# Patient Record
Sex: MALE | Race: WHITE | Employment: FULL TIME | URBAN - METROPOLITAN AREA
[De-identification: names, ages, dates, MRNs, and addresses within clinical notes are randomized per-mention and may not be internally consistent; named-entity substitution may affect disease eponyms.]

---

## 2021-04-21 PROBLEM — M54.2 NECK PAIN: Status: ACTIVE | Noted: 2021-04-21

## 2021-04-21 PROBLEM — R20.0 NUMBNESS OF FINGERS: Status: ACTIVE | Noted: 2021-04-21

## 2021-04-21 PROBLEM — Z00.00 WELL ADULT EXAM: Status: ACTIVE | Noted: 2021-04-21

## 2021-04-21 PROBLEM — Z12.5 SCREENING PSA (PROSTATE SPECIFIC ANTIGEN): Status: ACTIVE | Noted: 2021-04-21

## 2021-04-21 PROBLEM — Z12.11 SCREENING FOR COLON CANCER: Status: ACTIVE | Noted: 2021-04-21

## 2021-04-21 NOTE — PROGRESS NOTES
HPI  Pt here for well adult exam.  Having a lot of left post next pain that radiates down arm and numbness to 4th and 5th fingers. Worse when arm is on desk. Tingling to finger tips.      Family medical history-dm-mother    Diet-Mexican diet  Exercise-more Onset   • Diabetes Mother        Social History    Socioeconomic History      Marital status:       Spouse name: Not on file      Number of children: Not on file      Years of education: Not on file      Highest education level: Not on file    Occup Nose normal.      Mouth/Throat:      Mouth: Mucous membranes are moist.      Pharynx: Oropharynx is clear. Eyes:      General:         Right eye: No discharge. Left eye: No discharge.       Conjunctiva/sclera: Conjunctivae normal.      Pupils: Pup adult exam - Primary     Screening labs ordered  Please aim to eat a diet high in fresh fruits and vegetables, lean protein sources, complex carbohydrates and limited processed and fast foods.   Try to get at least 150 minutes of exercise per week-a [place]

## 2021-05-02 PROBLEM — E78.2 MIXED HYPERLIPIDEMIA: Status: ACTIVE | Noted: 2021-05-02

## 2021-05-02 PROBLEM — R73.03 PRE-DIABETES: Status: ACTIVE | Noted: 2021-05-02

## 2023-02-21 NOTE — PROGRESS NOTES
Blood pressure 113/75, pulse 83, height 5' 10\" (1.778 m), weight 194 lb (88 kg). Right knee pain for the past 2 months. Denies injury. Hurts going up and down stairs hurts at night. Difficulty straightening his leg. His job is sedentary but he does play basketball.     Objective    Right knee with positive Estuardo's test    No joint line tenderness    Assessment internal derangement of the right knee versus arthritis and history of prediabetes    Plan x-ray order entered MRI for prior authorization    Meloxicam for pain    Referral to orthopedics    Fasting blood test ordered and screening colonoscopy advised

## 2023-03-07 NOTE — TELEPHONE ENCOUNTER
Left message for patient to return phone call, see below    CT calcium scoring info below  Scheduling Information: To schedule a test at any [facility], call Christin Scheduling at [phone], Monday through Friday between 7:30am to 6pm and on Saturday between 8am and 1pm.  Evening and weekend appointments for your exam are available.

## 2023-03-09 NOTE — TELEPHONE ENCOUNTER
Using  Bárbara Aguirre  ID # [id number] attempted to call patient per Dr. Alegre Offer request would like patient scheduled 3/14/23 at 9:15 AM to discuss MRI of right knee. Patient did not answer. Left message on cell phone listed for patient to call the office back if still in need of an appt. Also tried to call daughter who did not answer. Dr. Ben Manzo not in [address] 3/13/23.

## 2023-03-10 NOTE — TELEPHONE ENCOUNTER
Left message for Anna Millan using  [phone]     Advised patient to call the office back if still in need of an appt with Dr. Nabila Lin [phone].

## 2023-04-11 NOTE — PROGRESS NOTES
Per verbal order from NATHAN Gallardo, draw up 5ml of 0.5% Marcaine and 1ml of Kenalog 40 for cortisone injection to right knee. Efrain Perrin MA  Patient provided education handout for cortisone injection.

## 2023-05-31 ENCOUNTER — NURSE TRIAGE (OUTPATIENT)
Dept: FAMILY MEDICINE CLINIC | Facility: CLINIC | Age: 58
End: 2023-05-31

## 2023-06-01 ENCOUNTER — OFFICE VISIT (OUTPATIENT)
Dept: FAMILY MEDICINE CLINIC | Facility: CLINIC | Age: 58
End: 2023-06-01

## 2023-06-01 VITALS
WEIGHT: 194 LBS | HEART RATE: 77 BPM | BODY MASS INDEX: 27.77 KG/M2 | DIASTOLIC BLOOD PRESSURE: 78 MMHG | SYSTOLIC BLOOD PRESSURE: 115 MMHG | HEIGHT: 70 IN

## 2023-06-01 DIAGNOSIS — D17.0 LIPOMA OF NECK: ICD-10-CM

## 2023-06-01 DIAGNOSIS — L02.91 ABSCESS: Primary | ICD-10-CM

## 2023-06-01 PROCEDURE — 99213 OFFICE O/P EST LOW 20 MIN: CPT | Performed by: PHYSICIAN ASSISTANT

## 2023-06-01 PROCEDURE — 3078F DIAST BP <80 MM HG: CPT | Performed by: PHYSICIAN ASSISTANT

## 2023-06-01 PROCEDURE — 3008F BODY MASS INDEX DOCD: CPT | Performed by: PHYSICIAN ASSISTANT

## 2023-06-01 PROCEDURE — 3074F SYST BP LT 130 MM HG: CPT | Performed by: PHYSICIAN ASSISTANT

## 2023-06-01 RX ORDER — CEPHALEXIN 500 MG/1
500 CAPSULE ORAL 3 TIMES DAILY
Qty: 21 CAPSULE | Refills: 0 | Status: SHIPPED | OUTPATIENT
Start: 2023-06-01 | End: 2023-06-08

## 2023-06-02 ENCOUNTER — OFFICE VISIT (OUTPATIENT)
Dept: SURGERY | Facility: CLINIC | Age: 58
End: 2023-06-02

## 2023-06-02 VITALS — BODY MASS INDEX: 27.77 KG/M2 | HEIGHT: 70 IN | WEIGHT: 194 LBS

## 2023-06-02 DIAGNOSIS — L72.3 INFLAMED SEBACEOUS CYST: Primary | ICD-10-CM

## 2023-06-02 NOTE — PROCEDURES
Procedure Note  The risks, benefits, alternatives and expected recovery were explained. The pt expressed understanding and wished to proceed with the procedure. Preop:  5 cm Infected sebaceous cyst of the neck  Postop:  Same  Procedure: Excisional debridement with scalpel of the skin and subcutaneous tissues and necrotic debris of 5 cm Infected sebaceous cyst of the neck   Anesthesia:  Local, 1% lidocaine with epi, 12 cc  EBL:  Minimal  Description:  The skin was prepped and draped in sterile fashion. The skin and subcutaneous tissue surrounding the cyst cavity was infiltrated with local anesthetic. An elliptical incision was made with a blade excisiing the skin and necrrotic subcutaneous tissue around and over the inflamed mass. Copious amount of purulent fluid and debris was expressed. The  Wound was irrigated, hemostasis assured, iodoform packing was placed and a gauze dressing was placed over this. The patient tolerated the procedure well and was given wound care instructions.         Tracey Matthews MD

## 2023-09-11 ENCOUNTER — TELEPHONE (OUTPATIENT)
Dept: PHYSICAL THERAPY | Facility: HOSPITAL | Age: 58
End: 2023-09-11

## 2023-09-12 ENCOUNTER — APPOINTMENT (OUTPATIENT)
Dept: PHYSICAL THERAPY | Facility: HOSPITAL | Age: 58
End: 2023-09-12
Attending: ORTHOPAEDIC SURGERY
Payer: COMMERCIAL

## 2023-09-13 ENCOUNTER — TELEPHONE (OUTPATIENT)
Dept: PHYSICAL THERAPY | Facility: HOSPITAL | Age: 58
End: 2023-09-13

## 2023-09-14 ENCOUNTER — OFFICE VISIT (OUTPATIENT)
Dept: PHYSICAL THERAPY | Facility: HOSPITAL | Age: 58
End: 2023-09-14
Attending: ORTHOPAEDIC SURGERY
Payer: COMMERCIAL

## 2023-09-14 DIAGNOSIS — M23.203 DEGENERATIVE TEAR OF MEDIAL MENISCUS, RIGHT: ICD-10-CM

## 2023-09-14 DIAGNOSIS — M23.300 DEGENERATIVE TEAR OF LATERAL MENISCUS, RIGHT: ICD-10-CM

## 2023-09-14 DIAGNOSIS — M17.11 PRIMARY OSTEOARTHRITIS OF RIGHT KNEE: Primary | ICD-10-CM

## 2023-09-14 PROCEDURE — 97161 PT EVAL LOW COMPLEX 20 MIN: CPT

## 2023-09-14 PROCEDURE — 97110 THERAPEUTIC EXERCISES: CPT

## 2023-09-19 ENCOUNTER — OFFICE VISIT (OUTPATIENT)
Dept: PHYSICAL THERAPY | Facility: HOSPITAL | Age: 58
End: 2023-09-19
Attending: ORTHOPAEDIC SURGERY
Payer: COMMERCIAL

## 2023-09-19 PROCEDURE — 97140 MANUAL THERAPY 1/> REGIONS: CPT

## 2023-09-19 PROCEDURE — 97110 THERAPEUTIC EXERCISES: CPT

## 2023-09-19 NOTE — PROGRESS NOTES
Diagnosis:   Primary osteoarthritis of right knee (M17.11)  Degenerative tear of medial meniscus, right (M23.203)  Degenerative tear of lateral meniscus, right (M23.300)      Referring Provider: Ronnie Branch Date of Evaluation:    9/14/2023    Precautions:  None Next MD visit:   none scheduled  Date of Surgery: n/a     Insurance Primary/Secondary: Trisha Menjivar / N/A     # Auth Visits: na            Subjective: Feeling well, R knee pain continues. No significant change since IE. Pain: 6/10 R medial knee      Objective:   9/19/23 AROM R knee: 0-130 deg supine    Taken at IE:  Observation: B genu varus  Palpation: TTP medial joint line and meniscus  Sensation: intact light touch BLE    AROM: (* denotes performed with pain)  Hip Knee   Flexion: R WNL; L WNL   Flexion: R 125*; L 140  Extension: R 0; L 0        Accessory motion: hypomobile R patella all planes; L patella WNL   Hypomobile tib/fem B (R>L)    Flexibility:  Hamstrings: R min; L min  Quads: R mod; L min  Gastroc-soleus: R min; L min    Strength/MMT: (* denotes performed with pain)  Hip Knee Foot/Ankle   Flexion: R 5/5; L 5-/5  Extension: R 4+/5; L 4+/5  ER: R 4+/5; L 5/5  IR: R 4+/5; L 5/5 Flexion: R 5/5; L 5/5  Extension: R 5/5; L 5/5    DF: R 5/5; L 5/5  PF: R 5/5; L 5/5  I       Balance: SLS: R 5 sec* pain R knee, L 3 sec    Assessment: Tolerates beginning strengthening; consider progressing HEP as tolerated. Increased ROM today after manual techniques. Consider adding leg press at next session.       Goals:   Goals: (to be met in 10 visits)  Pt will improve knee AROM flexion to >= 130 degrees to improve ability to perform stairs and squatting   Pt will improve quad strength to 5/5 to ascend 1 flight of stairs reciprocally without UE assist   Pt will demonstrate increased hip ER/ABD strength to 5/5 to perform stepping and squatting activities without excessive femoral IR/ADD   Pt will improve SLS to 5s to improve safety with gait on uneven surfaces such as grass and gravel  Pt will be independent and compliant with comprehensive HEP to maintain progress achieved in PT     Plan: Patient will be seen for 2 x/week or a total of 10 visits over a 90 day period. Treatment will include: Manual Therapy, Neuromuscular Re-education, Therapeutic Activities, Therapeutic Exercise, and Home Exercise Program instruction  Date: 9/19/2023  TX#: 2/10 Date:                 TX#: 3/ Date:                 TX#: 4/ Date:                 TX#: 5/ Date:    Tx#: 6/   TE: 17 mins  - stationary bike, seat 6, L5, x5'  - QS, 2x15 R  - heel slides, x15 R  - SLR, h/l, 3x10 R  - lateral stepping, YTB, 3 laps x10 (feels more pain with R hip abd)       MT: 23 mins  - tib/fem AP mob for flex Gr III  - tib/fem PA mob + ER for ext, screw home mech, Gr III  - patellar mobs R, nam sagittal plane                       HEP: quad set, SLS + arc slider, quad stretch    Charges: 1TE, 2MT       Total Timed Treatment: 40 min  Total Treatment Time: 40 min

## 2023-09-21 ENCOUNTER — OFFICE VISIT (OUTPATIENT)
Dept: PHYSICAL THERAPY | Facility: HOSPITAL | Age: 58
End: 2023-09-21
Attending: ORTHOPAEDIC SURGERY
Payer: COMMERCIAL

## 2023-09-21 PROCEDURE — 97112 NEUROMUSCULAR REEDUCATION: CPT

## 2023-09-21 PROCEDURE — 97140 MANUAL THERAPY 1/> REGIONS: CPT

## 2023-09-21 PROCEDURE — 97110 THERAPEUTIC EXERCISES: CPT

## 2023-09-21 NOTE — PROGRESS NOTES
Diagnosis:   Primary osteoarthritis of right knee (M17.11)  Degenerative tear of medial meniscus, right (M23.203)  Degenerative tear of lateral meniscus, right (M23.300)      Referring Provider: Manny Matamoros Date of Evaluation:    9/14/2023    Precautions:  None Next MD visit:   none scheduled  Date of Surgery: n/a     Insurance Primary/Secondary: Rochelle Jackson / N/A     # Auth Visits: na            Subjective: Felt better after previous session and the next day, \"but today I feel more pain. \" Reports he slept better. Pain: 5-6/10 R medial knee      Objective:   9/19/23 AROM R knee: 0-130 deg supine    Taken at IE:  Observation: B genu varus  Palpation: TTP medial joint line and meniscus  Sensation: intact light touch BLE    AROM: (* denotes performed with pain)  Hip Knee   Flexion: R WNL; L WNL   Flexion: R 125*; L 140  Extension: R 0; L 0        Accessory motion: hypomobile R patella all planes; L patella WNL   Hypomobile tib/fem B (R>L)    Flexibility:  Hamstrings: R min; L min  Quads: R mod; L min  Gastroc-soleus: R min; L min    Strength/MMT: (* denotes performed with pain)  Hip Knee Foot/Ankle   Flexion: R 5/5; L 5-/5  Extension: R 4+/5; L 4+/5  ER: R 4+/5; L 5/5  IR: R 4+/5; L 5/5 Flexion: R 5/5; L 5/5  Extension: R 5/5; L 5/5    DF: R 5/5; L 5/5  PF: R 5/5; L 5/5  I       Balance: SLS: R 5 sec* pain R knee, L 3 sec    Assessment: Difficulty with SL stability today, pain in medial knee and some genu valgus. Gave exercises for HEP to begin addressing strength and stability. Manual intervention for knee mobility; ROM is improving to 130 deg flexion.        Goals:   Goals: (to be met in 10 visits)  Pt will improve knee AROM flexion to >= 130 degrees to improve ability to perform stairs and squatting   Pt will improve quad strength to 5/5 to ascend 1 flight of stairs reciprocally without UE assist   Pt will demonstrate increased hip ER/ABD strength to 5/5 to perform stepping and squatting activities without excessive femoral IR/ADD   Pt will improve SLS to 5s to improve safety with gait on uneven surfaces such as grass and gravel  Pt will be independent and compliant with comprehensive HEP to maintain progress achieved in PT     Plan: Patient will be seen for 2 x/week or a total of 10 visits over a 90 day period. Treatment will include: Manual Therapy, Neuromuscular Re-education, Therapeutic Activities, Therapeutic Exercise, and Home Exercise Program instruction  Date: 9/19/2023  TX#: 2/10 Date:  9/21/23               TX#: 3/10 Date:                 TX#: 4/ Date:                 TX#: 5/ Date:    Tx#: 6/   TE: 17 mins  - stationary bike, seat 6, L5, x5'  - QS, 2x15 R  - heel slides, x15 R  - SLR, h/l, 3x10 R  - lateral stepping, YTB, 3 laps x10 (feels more pain with R hip abd) TE: 15 mins  - stationary bike, L3, x5'  - DL shuttle, 62#, x10  - SL shuttle, 62#, x20 B  - DL heel raise, x20  - BRENDAN stretch lvl 4, x1'        MT: 23 mins  - tib/fem AP mob for flex Gr III  - tib/fem PA mob + ER for ext, screw home mech, Gr III  - patellar mobs R, nam sagittal plane   MT: 15 mins  - tib/fem PA + ER for ext R  - patellar mobs nam sagittal plane R  - tib/fem AP Gr III  - PROM R knee       NR: 15 mins  - 4\" lateral step down, x20 R  - slider arc, x30 -mirror for vcs and 1UE support  - lateral slider, (mini squat) 2x10 B, 0UE  - QS x10 R             HEP: quad set, SLS + arc slider, quad stretch  9/21/23: add lateral stepping (YTB)    Charges: 1TE, 1MT, 1NR       Total Timed Treatment: 45 min  Total Treatment Time: 45 min

## 2023-09-26 ENCOUNTER — OFFICE VISIT (OUTPATIENT)
Dept: PHYSICAL THERAPY | Facility: HOSPITAL | Age: 58
End: 2023-09-26
Attending: ORTHOPAEDIC SURGERY
Payer: COMMERCIAL

## 2023-09-26 PROCEDURE — 97112 NEUROMUSCULAR REEDUCATION: CPT

## 2023-09-26 PROCEDURE — 97110 THERAPEUTIC EXERCISES: CPT

## 2023-09-26 PROCEDURE — 97140 MANUAL THERAPY 1/> REGIONS: CPT

## 2023-09-26 NOTE — PROGRESS NOTES
Diagnosis:   Primary osteoarthritis of right knee (M17.11)  Degenerative tear of medial meniscus, right (M23.203)  Degenerative tear of lateral meniscus, right (M23.300)      Referring Provider: Tyson Espinal Date of Evaluation:    9/14/2023    Precautions:  None Next MD visit:   none scheduled  Date of Surgery: n/a     Insurance Primary/Secondary: Rome Vargas / N/A     # Auth Visits: na            Subjective: Had a lot of pain after previous session 10/10 for a couple days. Took medication. Feeling a little better but still painful, nam with walking. Pain: 6-7/10 R medial knee (medial anterior nam today)      Objective:   9/19/23 AROM R knee: 0-130 deg supine    Taken at IE:  Observation: B genu varus  Palpation: TTP medial joint line and meniscus  Sensation: intact light touch BLE    AROM: (* denotes performed with pain)  Hip Knee   Flexion: R WNL; L WNL   Flexion: R 125*; L 140  Extension: R 0; L 0        Accessory motion: hypomobile R patella all planes; L patella WNL   Hypomobile tib/fem B (R>L)    Flexibility:  Hamstrings: R min; L min  Quads: R mod; L min  Gastroc-soleus: R min; L min    Strength/MMT: (* denotes performed with pain)  Hip Knee Foot/Ankle   Flexion: R 5/5; L 5-/5  Extension: R 4+/5; L 4+/5  ER: R 4+/5; L 5/5  IR: R 4+/5; L 5/5 Flexion: R 5/5; L 5/5  Extension: R 5/5; L 5/5    DF: R 5/5; L 5/5  PF: R 5/5; L 5/5  I       Balance: SLS: R 5 sec* pain R knee, L 3 sec    Assessment: Difficulty with full ROM d/t pain today. Some joint stiffness addressed with manual techniques. Open chain stability initiated with SLR to help with rolling in bed at night. Hip weakness contributes to increased stress on knee; recommend continued strengthening.        Goals:   Goals: (to be met in 10 visits)  Pt will improve knee AROM flexion to >= 130 degrees to improve ability to perform stairs and squatting   Pt will improve quad strength to 5/5 to ascend 1 flight of stairs reciprocally without UE assist   Pt will demonstrate increased hip ER/ABD strength to 5/5 to perform stepping and squatting activities without excessive femoral IR/ADD   Pt will improve SLS to 5s to improve safety with gait on uneven surfaces such as grass and gravel  Pt will be independent and compliant with comprehensive HEP to maintain progress achieved in PT     Plan: Patient will be seen for 2 x/week or a total of 10 visits over a 90 day period. Treatment will include: Manual Therapy, Neuromuscular Re-education, Therapeutic Activities, Therapeutic Exercise, and Home Exercise Program instruction  Date: 9/19/2023  TX#: 2/10 Date:  9/21/23               TX#: 3/10 Date:  9/26/23               TX#: 4/10 Date:                 TX#: 5/ Date:    Tx#: 6/   TE: 17 mins  - stationary bike, seat 6, L5, x5'  - QS, 2x15 R  - heel slides, x15 R  - SLR, h/l, 3x10 R  - lateral stepping, YTB, 3 laps x10 (feels more pain with R hip abd) TE: 15 mins  - stationary bike, L3, x5'  - DL shuttle, 62#, x10  - SL shuttle, 62#, x20 B  - DL heel raise, x20  - BRENDAN stretch lvl 4, x1'   TE: 20 mins  - stationary bike, L3, x5'  - LAQ, x10 5\" hold R  - prone quad stretch R  - SLR, h/l, 2x10 R  - SLR abd, 2x10 R  SLR add, 2x10 R     MT: 23 mins  - tib/fem AP mob for flex Gr III  - tib/fem PA mob + ER for ext, screw home mech, Gr III  - patellar mobs R, nam sagittal plane   MT: 15 mins  - tib/fem PA + ER for ext R  - patellar mobs nam sagittal plane R  - tib/fem AP Gr III  - PROM R knee MT: 15 mins  - tib/fem PA + ER of EOB for screw home mechanics  - tib/fem distraction over EOB  - patellar mobs R        NR: 15 mins  - 4\" lateral step down, x20 R  - slider arc, x30 -mirror for vcs and 1UE support  - lateral slider, (mini squat) 2x10 B, 0UE  - QS x10 R NR: 10 mins  - tib IR/ER on slider (seated)  - prone TKE, 3x10 R  - QS, 2x10 R            HEP: quad set, SLS + arc slider, quad stretch  9/21/23: add lateral stepping (YTB)  9/26/23: SLR flex and abd    Charges: 1TE, 1MT, 1NR       Total Timed Treatment: 45 min  Total Treatment Time: 45 min

## 2023-10-03 ENCOUNTER — OFFICE VISIT (OUTPATIENT)
Dept: PHYSICAL THERAPY | Facility: HOSPITAL | Age: 58
End: 2023-10-03
Attending: ORTHOPAEDIC SURGERY
Payer: COMMERCIAL

## 2023-10-03 PROCEDURE — 97112 NEUROMUSCULAR REEDUCATION: CPT

## 2023-10-03 PROCEDURE — 97110 THERAPEUTIC EXERCISES: CPT

## 2023-10-03 NOTE — PROGRESS NOTES
Diagnosis:   Primary osteoarthritis of right knee (M17.11)  Degenerative tear of medial meniscus, right (M23.203)  Degenerative tear of lateral meniscus, right (M23.300)      Referring Provider: Tyson Espinal Date of Evaluation:    9/14/2023    Precautions:  None Next MD visit:   none scheduled  Date of Surgery: n/a     Insurance Primary/Secondary: Rome Vargas / N/A     # Auth Visits: na            Subjective: Danced at a party over the weekend and had to stop d/t pain. Had increased pain the next day, too. Took an anti-inflammatory with benefit. Mostly \"feels the same. \"    Pain: 6-7/10 R medial knee (medial anterior nam today)      Objective:   9/19/23 AROM R knee: 0-130 deg supine    Taken at IE:  Observation: B genu varus  Palpation: TTP medial joint line and meniscus  Sensation: intact light touch BLE    AROM: (* denotes performed with pain)  Hip Knee   Flexion: R WNL; L WNL   Flexion: R 125*; L 140  Extension: R 0; L 0        Accessory motion: hypomobile R patella all planes; L patella WNL   Hypomobile tib/fem B (R>L)    Flexibility:  Hamstrings: R min; L min  Quads: R mod; L min  Gastroc-soleus: R min; L min    Strength/MMT: (* denotes performed with pain)  Hip Knee Foot/Ankle   Flexion: R 5/5; L 5-/5  Extension: R 4+/5; L 4+/5  ER: R 4+/5; L 5/5  IR: R 4+/5; L 5/5 Flexion: R 5/5; L 5/5  Extension: R 5/5; L 5/5    DF: R 5/5; L 5/5  PF: R 5/5; L 5/5  I       Balance: SLS: R 5 sec* pain R knee, L 3 sec    Assessment: Improved ROM and Wb'ing after repetitions of slider lunges. Difficulty with tibial IR d/t pain. Adjusted HEP accordingly. Would benefit from continued stability strengthening.        Goals:   Goals: (to be met in 10 visits)  Pt will improve knee AROM flexion to >= 130 degrees to improve ability to perform stairs and squatting   Pt will improve quad strength to 5/5 to ascend 1 flight of stairs reciprocally without UE assist   Pt will demonstrate increased hip ER/ABD strength to 5/5 to perform stepping and squatting activities without excessive femoral IR/ADD   Pt will improve SLS to 5s to improve safety with gait on uneven surfaces such as grass and gravel  Pt will be independent and compliant with comprehensive HEP to maintain progress achieved in PT     Plan: Patient will be seen for 2 x/week or a total of 10 visits over a 90 day period. Treatment will include: Manual Therapy, Neuromuscular Re-education, Therapeutic Activities, Therapeutic Exercise, and Home Exercise Program instruction  Date: 9/19/2023  TX#: 2/10 Date:  9/21/23               TX#: 3/10 Date:  9/26/23               TX#: 4/10 Date:  10/3/23               TX#: 5/10 Date:    Tx#: 6/   TE: 17 mins  - stationary bike, seat 6, L5, x5'  - QS, 2x15 R  - heel slides, x15 R  - SLR, h/l, 3x10 R  - lateral stepping, YTB, 3 laps x10 (feels more pain with R hip abd) TE: 15 mins  - stationary bike, L3, x5'  - DL shuttle, 62#, x10  - SL shuttle, 62#, x20 B  - DL heel raise, x20  - BRENDAN stretch lvl 4, x1'   TE: 20 mins  - stationary bike, L3, x5'  - LAQ, x10 5\" hold R  - prone quad stretch R  - SLR, h/l, 2x10 R  - SLR abd, 2x10 R  SLR add, 2x10 R TE: 31 mins  -DKTC swissball x30  -HS curl swissball 2x10 R  - HS stretch on SB supine, 2x30\" R  - s/l hip abd SLR, 2.5#, 2x10 R  - s/l hip add SLR, 2x10 R  - mini slide lunge with glider, 3x10 B  - BRENDAN Lvl 4, x1'  - SL shuttle, 30#, x30 R  - 2:1 leg press 75lb 2x10      MT: 23 mins  - tib/fem AP mob for flex Gr III  - tib/fem PA mob + ER for ext, screw home mech, Gr III  - patellar mobs R, nam sagittal plane   MT: 15 mins  - tib/fem PA + ER for ext R  - patellar mobs nam sagittal plane R  - tib/fem AP Gr III  - PROM R knee MT: 15 mins  - tib/fem PA + ER of EOB for screw home mechanics  - tib/fem distraction over EOB  - patellar mobs R        NR: 15 mins  - 4\" lateral step down, x20 R  - slider arc, x30 -mirror for vcs and 1UE support  - lateral slider, (mini squat) 2x10 B, 0UE  - QS x10 R NR: 10 mins  - tib IR/ER on slider (seated)  - prone TKE, 3x10 R  - QS, 2x10 R NR: 9 mins  - tib IR/ER on slider (seated) with RTB resistance, x30 ER R, x15 IR (pain)  - trial SLS + arc slider, 2x5 - pain  -SL TKE on slant board with RTB x30 R            HEP: quad set, SLS + arc slider, quad stretch  9/21/23: add lateral stepping (YTB)  9/26/23: SLR flex and abd  10/3/23: replace slider arc with lateral slider lunge  Charges: 2TE, 1NR       Total Timed Treatment: 40 min  Total Treatment Time: 40 min

## 2023-10-05 ENCOUNTER — OFFICE VISIT (OUTPATIENT)
Dept: PHYSICAL THERAPY | Facility: HOSPITAL | Age: 58
End: 2023-10-05
Attending: ORTHOPAEDIC SURGERY
Payer: COMMERCIAL

## 2023-10-05 PROCEDURE — 97140 MANUAL THERAPY 1/> REGIONS: CPT

## 2023-10-05 PROCEDURE — 97112 NEUROMUSCULAR REEDUCATION: CPT

## 2023-10-05 PROCEDURE — 97110 THERAPEUTIC EXERCISES: CPT

## 2023-10-05 NOTE — PROGRESS NOTES
Discharge Summary  Pt has attended 6 visits in Physical Therapy. Diagnosis:   Primary osteoarthritis of right knee (M17.11)  Degenerative tear of medial meniscus, right (M23.203)  Degenerative tear of lateral meniscus, right (M23.300)      Referring Provider: Ras Mahan Date of Evaluation:    9/14/2023    Precautions:  None Next MD visit:   none scheduled  Date of Surgery: n/a     Insurance Primary/Secondary: Vitaliy Hong / N/A     # Auth Visits: na            Subjective: Felt good on Wednesday, \"not much pain\" but did some exercises at night and felt pain. Pain continues today. Reports he feels well when he takes medication but pain is the same without. Pain: 8/10 R medial knee       Objective:   10/5/23: 0-128 deg supine  9/19/23 AROM R knee: 0-130 deg supine    Palpation: TTP medial joint line and meniscus    Strength/MMT: (* denotes performed with pain)  Hip Knee   Flexion: R 5/5; L 5/5  Extension: R 5/5; L 5/5  ER: R 5/5; L 5/5  IR: R 5/5; L 5/5 Flexion: R 5/5; L 5/5  Extension: R 5-/5* initially, but then breaks d/t pain, L 5/5          Balance: SLS: R 5 sec, L 5 sec    Assessment: Some goals met with improved ROM and hip strength. Quad strength is limited due to pain. Significant pain continues to limit patient's mobility and decreased pain from PT is temporary. Cristy Vance in progress; recommend patient return to his orthopaedic MD for further intervention, possible second injection. Patient agrees with this plan. Reviewed HEP; good understanding .      Goals:   Goals: (to be met in 10 visits)  Pt will improve knee AROM flexion to >= 130 degrees to improve ability to perform stairs and squatting - partially met  Pt will improve quad strength to 5/5 to ascend 1 flight of stairs reciprocally without UE assist - not met, still painful, strength limited by pain  Pt will demonstrate increased hip ER/ABD strength to 5/5 to perform stepping and squatting activities without excessive femoral IR/ADD - goal met  Pt will improve SLS to 5s to improve safety with gait on uneven surfaces such as grass and gravel -  goal met  Pt will be independent and compliant with comprehensive HEP to maintain progress achieved in PT  - goal met    Plan: Discharge PT.  Return to orthopaedic MD.  Date: 9/19/2023  TX#: 2/10 Date:  9/21/23               TX#: 3/10 Date:  9/26/23               TX#: 4/10 Date:  10/3/23               TX#: 5/10 Date: 10/5/23  Tx#: 6/10   TE: 17 mins  - stationary bike, seat 6, L5, x5'  - QS, 2x15 R  - heel slides, x15 R  - SLR, h/l, 3x10 R  - lateral stepping, YTB, 3 laps x10 (feels more pain with R hip abd) TE: 15 mins  - stationary bike, L3, x5'  - DL shuttle, 62#, x10  - SL shuttle, 62#, x20 B  - DL heel raise, x20  - BRENDAN stretch lvl 4, x1'   TE: 20 mins  - stationary bike, L3, x5'  - LAQ, x10 5\" hold R  - prone quad stretch R  - SLR, h/l, 2x10 R  - SLR abd, 2x10 R  SLR add, 2x10 R TE: 31 mins  -DKTC swissball x30  -HS curl swissball 2x10 R  - HS stretch on SB supine, 2x30\" R  - s/l hip abd SLR, 2.5#, 2x10 R  - s/l hip add SLR, 2x10 R  - mini slide lunge with glider, 3x10 B  - BRENDAN Lvl 4, x1'  - SL shuttle, 30#, x30 R  - 2:1 leg press 75lb 2x10   TE: 24 mins  - DL shuttle, 50# to 90 deg, x15  - SL shuttle, 30#, to 90 deg, x15 R  - 2:1 leg press, 62#, x10 R - pain  - heel slides, 2x20 R  - reassessment, review POC  - s/l SRL add, 2x15 R  - s/l hip abd, x10 R     MT: 23 mins  - tib/fem AP mob for flex Gr III  - tib/fem PA mob + ER for ext, screw home mech, Gr III  - patellar mobs R, nam sagittal plane   MT: 15 mins  - tib/fem PA + ER for ext R  - patellar mobs nam sagittal plane R  - tib/fem AP Gr III  - PROM R knee MT: 15 mins  - tib/fem PA + ER of EOB for screw home mechanics  - tib/fem distraction over EOB  - patellar mobs R    MT: 10 mins  - PROM R knee  - tib/fem PA+ ER to promote screw lor mechanism  - patellar mob R (minimal)    NR: 15 mins  - 4\" lateral step down, x20 R  - slider arc, x30 -mirror for vcs and 1UE support  - lateral slider, (mini squat) 2x10 B, 0UE  - QS x10 R NR: 10 mins  - tib IR/ER on slider (seated)  - prone TKE, 3x10 R  - QS, 2x10 R NR: 9 mins  - tib IR/ER on slider (seated) with RTB resistance, x30 ER R, x15 IR (pain)  - trial SLS + arc slider, 2x5 - pain  -SL TKE on slant board with RTB x30 R  NR: 10 mins  - QS, 2x20 R  - SLR, 2x15 R  - forw lunge, 10# KB, 2x10 R          HEP: quad set, SLS + arc slider, quad stretch  9/21/23: add lateral stepping (YTB)  9/26/23: SLR flex and abd  10/3/23: replace slider arc with lateral slider lunge  Charges: 2TE, 1MT, 1NR       Total Timed Treatment: 45 min  Total Treatment Time: 45 min    Thank you for your referral. If you have any questions, please contact me at Dept: [phone].     Sincerely,  Electronically signed by therapist: Rajesh Canela, PT     Certification From: 30/4/4669  To:1/3/2024

## 2023-11-20 ENCOUNTER — OFFICE VISIT (OUTPATIENT)
Dept: FAMILY MEDICINE CLINIC | Facility: CLINIC | Age: 58
End: 2023-11-20
Payer: COMMERCIAL

## 2023-11-20 VITALS
DIASTOLIC BLOOD PRESSURE: 70 MMHG | SYSTOLIC BLOOD PRESSURE: 126 MMHG | HEART RATE: 97 BPM | WEIGHT: 190 LBS | BODY MASS INDEX: 27.2 KG/M2 | HEIGHT: 70 IN

## 2023-11-20 DIAGNOSIS — Z12.12 ENCOUNTER FOR COLORECTAL CANCER SCREENING: ICD-10-CM

## 2023-11-20 DIAGNOSIS — M23.91 INTERNAL DERANGEMENT OF RIGHT KNEE: Primary | ICD-10-CM

## 2023-11-20 DIAGNOSIS — Z12.11 ENCOUNTER FOR COLORECTAL CANCER SCREENING: ICD-10-CM

## 2023-11-20 PROCEDURE — 99213 OFFICE O/P EST LOW 20 MIN: CPT | Performed by: FAMILY MEDICINE

## 2023-11-20 PROCEDURE — 3078F DIAST BP <80 MM HG: CPT | Performed by: FAMILY MEDICINE

## 2023-11-20 PROCEDURE — 3008F BODY MASS INDEX DOCD: CPT | Performed by: FAMILY MEDICINE

## 2023-11-20 PROCEDURE — 3074F SYST BP LT 130 MM HG: CPT | Performed by: FAMILY MEDICINE

## 2023-11-20 RX ORDER — DICLOFENAC SODIUM 75 MG/1
75 TABLET, DELAYED RELEASE ORAL DAILY
Qty: 90 TABLET | Refills: 0 | Status: SHIPPED | OUTPATIENT
Start: 2023-11-20 | End: 2024-01-19

## 2023-11-20 NOTE — PROGRESS NOTES
Blood pressure 126/70, pulse 97, height 5' 10\" (1.778 m), weight 190 lb (86.2 kg). Patient presents today following up for right knee pain. Has had relief with diclofenac. Physical therapy not helpful. Otherwise feels well.     Objective patient is comfortable no apparent distress    Assessment right knee arthritis    Plan diclofenac prescription    FIT cards for colon cancer screening    We will follow with results

## 2023-12-08 ENCOUNTER — TELEPHONE (OUTPATIENT)
Dept: ORTHOPEDICS CLINIC | Facility: CLINIC | Age: 58
End: 2023-12-08

## 2024-05-13 ENCOUNTER — OFFICE VISIT (OUTPATIENT)
Dept: FAMILY MEDICINE CLINIC | Facility: CLINIC | Age: 59
End: 2024-05-13

## 2024-05-13 VITALS
BODY MASS INDEX: 27.77 KG/M2 | RESPIRATION RATE: 17 BRPM | SYSTOLIC BLOOD PRESSURE: 115 MMHG | WEIGHT: 194 LBS | HEART RATE: 106 BPM | HEIGHT: 70 IN | DIASTOLIC BLOOD PRESSURE: 83 MMHG

## 2024-05-13 DIAGNOSIS — Z12.11 COLON CANCER SCREENING: ICD-10-CM

## 2024-05-13 DIAGNOSIS — Z13.21 ENCOUNTER FOR VITAMIN DEFICIENCY SCREENING: ICD-10-CM

## 2024-05-13 DIAGNOSIS — Z13.0 SCREENING FOR DEFICIENCY ANEMIA: ICD-10-CM

## 2024-05-13 DIAGNOSIS — M17.11 PRIMARY OSTEOARTHRITIS OF RIGHT KNEE: ICD-10-CM

## 2024-05-13 DIAGNOSIS — Z13.29 THYROID DISORDER SCREEN: ICD-10-CM

## 2024-05-13 DIAGNOSIS — M23.306 DEGENERATIVE TEAR OF MENISCUS OF RIGHT KNEE: ICD-10-CM

## 2024-05-13 DIAGNOSIS — Z13.220 LIPID SCREENING: ICD-10-CM

## 2024-05-13 DIAGNOSIS — Z00.00 WELLNESS EXAMINATION: Primary | ICD-10-CM

## 2024-05-13 RX ORDER — MELOXICAM 7.5 MG/1
7.5 TABLET ORAL DAILY
Qty: 30 TABLET | Refills: 1 | Status: SHIPPED | OUTPATIENT
Start: 2024-05-13

## 2024-05-13 NOTE — PROGRESS NOTES
HPI:   Jamal Walker is a 58 year old male who presents for an Annual Health Visit.   Patient is here to follow-up regarding knee pain as well, patient reports that he has prior history of knee pain, was seen by Ortho and was recommended to start physical therapy, he completed physical therapy with no improvement, he has been using medication from [place] which is a combination of NSAIDs, vitamin B12 and Tylenol with improvement of the symptoms, he takes medication once a day but if he does not take the medication he is not able to function normally at work    Allergies:   No Known Allergies    CURRENT MEDICATIONS   No current outpatient medications on file.        HISTORICAL INFORMATION   Past Medical History:    Lipid screening    per [employer]      Past Surgical History:   Procedure Laterality Date    Other surgical history Right     Radha Detachment       Family History   Problem Relation Age of Onset    Diabetes Mother       SOCIAL HISTORY   Social History     Socioeconomic History    Marital status:    Tobacco Use    Smoking status: Never    Smokeless tobacco: Never   Vaping Use    Vaping status: Never Used   Substance and Sexual Activity    Alcohol use: No     Alcohol/week: 0.0 standard drinks of alcohol    Drug use: Not Currently     Social History     Social History Narrative    Not on file        REVIEW OF SYSTEMS:     Review of Systems   Constitutional: Negative.    Respiratory: Negative.     Cardiovascular: Negative.    Gastrointestinal: Negative.    Musculoskeletal:  Positive for arthralgias and gait problem.   Skin: Negative.          EXAM:   /83   Pulse 106   Resp 17   Ht 5' 10\" (1.778 m)   Wt 194 lb (88 kg)   BMI 27.84 kg/m²    Wt Readings from Last 6 Encounters:   05/13/24 194 lb (88 kg)   11/20/23 190 lb (86.2 kg)   06/02/23 194 lb (88 kg)   06/01/23 194 lb (88 kg)   02/21/23 194 lb (88 kg)   10/11/22 196 lb (88.9 kg)     Body mass index is 27.84 kg/m².    Physical  Exam  Vitals and nursing note reviewed.   Constitutional:       Appearance: He is well-developed.   Cardiovascular:      Rate and Rhythm: Normal rate and regular rhythm.      Heart sounds: Normal heart sounds.   Pulmonary:      Effort: Pulmonary effort is normal.      Breath sounds: Normal breath sounds.   Abdominal:      General: Bowel sounds are normal.      Palpations: Abdomen is soft.   Skin:     General: Skin is warm and dry.   Neurological:      Mental Status: He is alert and oriented to person, place, and time.      Deep Tendon Reflexes: Reflexes are normal and symmetric.          ASSESSMENT AND PLAN:   Jamal was seen today for physical.    Diagnoses and all orders for this visit:    Wellness examination  -     Comp Metabolic Panel (14); Future  -     CBC With Differential With Platelet; Future  -     Lipid Panel; Future  -     TSH W Reflex To Free T4; Future  -     Vitamin D, 25-Hydroxy; Future    Encounter for vitamin deficiency screening  -     Vitamin D, 25-Hydroxy; Future    Lipid screening  -     Lipid Panel; Future    Screening for deficiency anemia  -     CBC With Differential With Platelet; Future    Thyroid disorder screen  -     TSH W Reflex To Free T4; Future    Degenerative tear of meniscus of right knee  -     PHYSIATRY - INTERNAL    Primary osteoarthritis of right knee  -     PHYSIATRY - INTERNAL    Colon cancer screening  -     COLOGUARD COLON CANCER SCREENING (EXTERNAL)    Reviewed report of x-ray as well as MRI of the knee, patient failed physical therapy, still experiencing significant pain, will refer to physiatry as patient would like a second opinion.  Blood work per orders, reinforced colon cancer screening      The patient indicates understanding of these issues and agrees to the plan.    Problem List:  Patient Active Problem List   Diagnosis    Well adult exam    Neck pain    Numbness of fingers    Screening for colon cancer    Screening PSA (prostate specific antigen)     Pre-diabetes    Mixed hyperlipidemia       Chrissie Oliver MD  5/13/2024  4:03 PM

## 2024-05-23 ENCOUNTER — OFFICE VISIT (OUTPATIENT)
Dept: PHYSICAL MEDICINE AND REHAB | Facility: CLINIC | Age: 59
End: 2024-05-23

## 2024-05-23 DIAGNOSIS — M17.11 PRIMARY OSTEOARTHRITIS OF RIGHT KNEE: Primary | ICD-10-CM

## 2024-05-23 PROCEDURE — 99204 OFFICE O/P NEW MOD 45 MIN: CPT | Performed by: PHYSICAL MEDICINE & REHABILITATION

## 2024-05-24 NOTE — PROGRESS NOTES
[facility]  NEW PATIENT EVALUATION    Consultation as a request of Dr. Mathew      HISTORY OF PRESENT ILLNESS:     Chief Complaint   Patient presents with    New Patient     New right hand dominant patient presents for right knee pain, gradual onset. Pain has been constant for the past 2 weeks. Pain 9/10. Patient completed PT with no relief. HEP with no relief. Denies N/T. Denies weakness. MRI & XR 3/7/23.       The patient is a 58 year old male with no significant past medical history who presents with right knee pain.  Patient denies any specific injury or trauma.  Onset of symptoms has been ongoing for many years.  Over the last 2 weeks the pain was much more flared up.  He states when he was in [place] traveling the pain had gotten worse.  Pain is located diffusely throughout the knee and along the medial compartment primarily.  Pain worsens with increased activity and ambulation.  He has had physical therapy in the past as well as corticosteroid injection without any significant relief.  He recently was placed on meloxicam and states he noted great improvement since then the pain is pretty much resolved.  At this point he denies any instability or mechanical symptoms.  He denies any further radiating symptoms in the leg, any proximal pain in the groin or lower back.  He has had x-ray and MRI imaging of the knee as noted below.    PHYSICAL EXAM:   There were no vitals taken for this visit.    KNEE:  Inspection: no erythema, swelling, or obvious deformity  Palpation: Mild tenderness to palpation along the medial joint line and medial femoral condyle.  ROM: Full active ROM in flexion and extension  Strength: 5/5  Sensation: Intact to light touch   Herrera Test: negative for patellofemoral pain  Bounce Home test: negative for \"catch\" or pain      IMAGING:     MRI right knee completed on 3/7/2023 was personally reviewed which is notable for tricompartmental osteoarthritis  severe in the medial compartment with extensive complex degenerative tearing throughout the entire medial meniscus.  There was stress changes in the medial compartment noted at that time.    All imaging results were reviewed and discussed with patient.      ASSESSMENT/PLAN:     1. Primary osteoarthritis of right knee        Jamal Walker is a pleasant 58-year-old male presenting today for evaluation of right knee pain secondary to osteoarthritis.  I suspect he may have had stress reaction along the medial femoral condyle or even SONK though less likely that is now resolved.  Recommend that he take NSAID as needed and I have advised him to start aqua therapy program with home exercises.  Recommend he follow-up in 4 weeks in the office.  If pain is no better we may consider further diagnostic imaging such as MRI prior to consideration of any interventional procedures such as a knee injection with corticosteroid.      The patient verbalized understanding with the plan and was in agreement. All questions/concerns were addressed and there were no barriers to learning.  Please note Dragon dictation software was used to dictate this note and may result in inadvertent typos.    Mckenzie Oneill DO, FAAPMR & CAQSM  Physical Medicine and Rehabilitation  Sports and Spine Medicine    PAST MEDICAL HISTORY:     Past Medical History:    Lipid screening    per [employer]         PAST SURGICAL HISTORY:     Past Surgical History:   Procedure Laterality Date    Other surgical history Right     Radha Detachment          CURRENT MEDICATIONS:     Current Outpatient Medications   Medication Sig Dispense Refill    Meloxicam 7.5 MG Oral Tab Take 1 tablet (7.5 mg total) by mouth daily. 30 tablet 1         ALLERGIES:   No Known Allergies      FAMILY HISTORY:     Family History   Problem Relation Age of Onset    Diabetes Mother           SOCIAL HISTORY:     Social History     Socioeconomic History    Marital status:    Tobacco Use     Smoking status: Never    Smokeless tobacco: Never   Vaping Use    Vaping status: Never Used   Substance and Sexual Activity    Alcohol use: No     Alcohol/week: 0.0 standard drinks of alcohol    Drug use: Not Currently          REVIEW OF SYSTEMS:   Patient-reported ROS  Constitutional  Sleep Disturbance: denies  Chills: denies  Fever: denies  Weight Gain: denies  Weight Loss: denies   Cardiovascular  Chest Pain: denies  Irregular Heartbeat: denies   Respiratory  Painful Breathing: denies  Wheezing: denies   Gastrointestinal  Bowel Incontinence: denies  Heartburn: denies  Abdominal Pain: denies  Blood in Stool : denies  Rectal Pain: denies   Hematology  Easy Bruising: denies  Easy Bleeding: denies   Genitourinary  Difficulty Urinating: denies  Bladder Incontinence: denies  Pelvic Pain: denies  Painful Urination: denies   Musculoskeletal  Joint Stiffness: denies  Painful Joints: denies  Tailbone Pain: denies  Swollen Joints: denies   Peripheral Vascular  Swelling of Legs/Feet: denies  Cold Extremities: denies   Skin  Open Sores: denies  Nodules or Lumps: denies  Rash: denies   Neurological  Loss of Strength Since last Visit: denies  Tingling/Numbness: denies  Balance: denies   Psychiatric  Anxiety: denies  Depressed Mood: denies       PHYSICAL EXAM:   General: No immediate distress  Head: Normocephalic/ Atraumatic  Eyes: Extra-occular movements intact.   Ears: No auricular hematoma or deformities  Mouth: No lesions or ulcerations  Heart: peripheral pulses intact. Normal capillary refill.   Lungs: Non-labored respirations  Abdomen: No abdominal guarding  Extremities: No lower extremity edema bilaterally   Skin: No lesions noted   Cognition: alert & oriented x 3, attentive, able to follow 2 step commands, comprehention intact, spontaneous speech intact  Psychiatric: Mood and affect appropriate      LABS:     Lab Results   Component Value Date     (H) 03/05/2023    A1C 6.2 (H) 03/05/2023     Lab Results    Component Value Date    WBC 6.6 11/01/2015    RBC 4.76 11/01/2015    HGB 13.0 (L) 11/01/2015    HCT 41.3 11/01/2015    MCV 86.7 11/01/2015    MCH 27.4 11/01/2015    MCHC 31.6 (L) 11/01/2015    RDW 13.9 11/01/2015     11/01/2015    MPV 9.5 11/01/2015     Lab Results   Component Value Date     (H) 03/05/2023    BUN 18 03/05/2023    BUNCREA 20.2 (H) 03/05/2023    CREATSERUM 0.89 03/05/2023    ANIONGAP 5 03/05/2023    GFRNAA 96 04/25/2021    GFRAA 112 04/25/2021    CA 8.9 03/05/2023    OSMOCALC 299 (H) 03/05/2023    ALKPHO 62 03/05/2023    AST 22 03/05/2023    ALT 51 03/05/2023    ALKPHOS 55 11/01/2015    BILT 0.5 03/05/2023    TP 7.2 03/05/2023    ALB 3.8 03/05/2023    GLOBULIN 3.4 03/05/2023    AGRATIO 1.3 11/01/2015     03/05/2023    K 4.4 03/05/2023     03/05/2023    CO2 28.0 03/05/2023     No results found for: \"PTP\", \"PT\", \"INR\"  Lab Results   Component Value Date    VITD 20.5 (L) 04/25/2021

## 2024-05-29 LAB — AMB EXT COLOGUARD RESULT: NEGATIVE

## 2024-06-03 ENCOUNTER — TELEPHONE (OUTPATIENT)
Dept: FAMILY MEDICINE CLINIC | Facility: CLINIC | Age: 59
End: 2024-06-03

## 2024-06-09 ENCOUNTER — LAB ENCOUNTER (OUTPATIENT)
Dept: LAB | Facility: HOSPITAL | Age: 59
End: 2024-06-09
Attending: FAMILY MEDICINE
Payer: COMMERCIAL

## 2024-06-09 DIAGNOSIS — Z13.0 SCREENING FOR DEFICIENCY ANEMIA: ICD-10-CM

## 2024-06-09 DIAGNOSIS — Z13.220 LIPID SCREENING: ICD-10-CM

## 2024-06-09 DIAGNOSIS — R73.09 ELEVATED GLUCOSE: ICD-10-CM

## 2024-06-09 DIAGNOSIS — Z13.21 ENCOUNTER FOR VITAMIN DEFICIENCY SCREENING: ICD-10-CM

## 2024-06-09 DIAGNOSIS — Z00.00 WELLNESS EXAMINATION: ICD-10-CM

## 2024-06-09 DIAGNOSIS — Z13.29 THYROID DISORDER SCREEN: ICD-10-CM

## 2024-06-09 LAB
ALBUMIN SERPL-MCNC: 4.4 G/DL (ref 3.2–4.8)
ALBUMIN/GLOB SERPL: 1.6 {RATIO} (ref 1–2)
ALP LIVER SERPL-CCNC: 61 U/L
ALT SERPL-CCNC: 35 U/L
ANION GAP SERPL CALC-SCNC: 4 MMOL/L (ref 0–18)
AST SERPL-CCNC: 28 U/L (ref ?–34)
BASOPHILS # BLD AUTO: 0.03 X10(3) UL (ref 0–0.2)
BASOPHILS NFR BLD AUTO: 0.5 %
BILIRUB SERPL-MCNC: 0.4 MG/DL (ref 0.3–1.2)
BUN BLD-MCNC: 16 MG/DL (ref 9–23)
BUN/CREAT SERPL: 18 (ref 10–20)
CALCIUM BLD-MCNC: 9.1 MG/DL (ref 8.7–10.4)
CHLORIDE SERPL-SCNC: 112 MMOL/L (ref 98–112)
CHOLEST SERPL-MCNC: 212 MG/DL (ref ?–200)
CO2 SERPL-SCNC: 28 MMOL/L (ref 21–32)
CREAT BLD-MCNC: 0.89 MG/DL
DEPRECATED RDW RBC AUTO: 46.2 FL (ref 35.1–46.3)
EGFRCR SERPLBLD CKD-EPI 2021: 100 ML/MIN/1.73M2 (ref 60–?)
EOSINOPHIL # BLD AUTO: 0.14 X10(3) UL (ref 0–0.7)
EOSINOPHIL NFR BLD AUTO: 2.3 %
ERYTHROCYTE [DISTWIDTH] IN BLOOD BY AUTOMATED COUNT: 14.2 % (ref 11–15)
FASTING PATIENT LIPID ANSWER: YES
FASTING STATUS PATIENT QL REPORTED: YES
GLOBULIN PLAS-MCNC: 2.7 G/DL (ref 2–3.5)
GLUCOSE BLD-MCNC: 106 MG/DL (ref 70–99)
HCT VFR BLD AUTO: 41.7 %
HDLC SERPL-MCNC: 39 MG/DL (ref 40–59)
HGB BLD-MCNC: 13.3 G/DL
IMM GRANULOCYTES # BLD AUTO: 0.02 X10(3) UL (ref 0–1)
IMM GRANULOCYTES NFR BLD: 0.3 %
LDLC SERPL CALC-MCNC: 157 MG/DL (ref ?–100)
LYMPHOCYTES # BLD AUTO: 2.54 X10(3) UL (ref 1–4)
LYMPHOCYTES NFR BLD AUTO: 42.5 %
MCH RBC QN AUTO: 28.2 PG (ref 26–34)
MCHC RBC AUTO-ENTMCNC: 31.9 G/DL (ref 31–37)
MCV RBC AUTO: 88.5 FL
MONOCYTES # BLD AUTO: 0.5 X10(3) UL (ref 0.1–1)
MONOCYTES NFR BLD AUTO: 8.4 %
NEUTROPHILS # BLD AUTO: 2.74 X10 (3) UL (ref 1.5–7.7)
NEUTROPHILS # BLD AUTO: 2.74 X10(3) UL (ref 1.5–7.7)
NEUTROPHILS NFR BLD AUTO: 46 %
NONHDLC SERPL-MCNC: 173 MG/DL (ref ?–130)
OSMOLALITY SERPL CALC.SUM OF ELEC: 300 MOSM/KG (ref 275–295)
PLATELET # BLD AUTO: 260 10(3)UL (ref 150–450)
POTASSIUM SERPL-SCNC: 4.7 MMOL/L (ref 3.5–5.1)
PROT SERPL-MCNC: 7.1 G/DL (ref 5.7–8.2)
RBC # BLD AUTO: 4.71 X10(6)UL
SODIUM SERPL-SCNC: 144 MMOL/L (ref 136–145)
TRIGL SERPL-MCNC: 91 MG/DL (ref 30–149)
TSI SER-ACNC: 1.85 MIU/ML (ref 0.55–4.78)
VIT D+METAB SERPL-MCNC: 34.4 NG/ML (ref 30–100)
VLDLC SERPL CALC-MCNC: 17 MG/DL (ref 0–30)
WBC # BLD AUTO: 6 X10(3) UL (ref 4–11)

## 2024-06-09 PROCEDURE — 84443 ASSAY THYROID STIM HORMONE: CPT

## 2024-06-09 PROCEDURE — 36415 COLL VENOUS BLD VENIPUNCTURE: CPT

## 2024-06-09 PROCEDURE — 82306 VITAMIN D 25 HYDROXY: CPT

## 2024-06-09 PROCEDURE — 85025 COMPLETE CBC W/AUTO DIFF WBC: CPT

## 2024-06-09 PROCEDURE — 83036 HEMOGLOBIN GLYCOSYLATED A1C: CPT

## 2024-06-09 PROCEDURE — 80053 COMPREHEN METABOLIC PANEL: CPT

## 2024-06-09 PROCEDURE — 80061 LIPID PANEL: CPT

## 2024-06-10 DIAGNOSIS — R73.09 ELEVATED GLUCOSE: Primary | ICD-10-CM

## 2024-06-10 LAB
EST. AVERAGE GLUCOSE BLD GHB EST-MCNC: 123 MG/DL (ref 68–126)
HBA1C MFR BLD: 5.9 % (ref ?–5.7)

## 2024-06-12 ENCOUNTER — TELEPHONE (OUTPATIENT)
Dept: FAMILY MEDICINE CLINIC | Facility: CLINIC | Age: 59
End: 2024-06-12

## 2024-06-12 NOTE — TELEPHONE ENCOUNTER
Using language line  Beatrice [id number]:   The patient has a form which needs to be filled out of his work with his physical and labs.  He will fax the form to Dr. Mathew office at   Clinical staff please assist with the form.

## 2024-06-24 ENCOUNTER — MED REC SCAN ONLY (OUTPATIENT)
Dept: PHYSICAL MEDICINE AND REHAB | Facility: CLINIC | Age: 59
End: 2024-06-24

## 2024-07-15 ENCOUNTER — MED REC SCAN ONLY (OUTPATIENT)
Dept: PHYSICAL MEDICINE AND REHAB | Facility: CLINIC | Age: 59
End: 2024-07-15

## 2024-07-23 DIAGNOSIS — M25.561 RIGHT KNEE PAIN, UNSPECIFIED CHRONICITY: Primary | ICD-10-CM

## 2024-07-23 DIAGNOSIS — Z01.89 ENCOUNTER FOR LOWER EXTREMITY COMPARISON IMAGING STUDY: ICD-10-CM

## 2024-07-25 ENCOUNTER — OFFICE VISIT (OUTPATIENT)
Dept: ORTHOPEDICS CLINIC | Facility: CLINIC | Age: 59
End: 2024-07-25
Payer: COMMERCIAL

## 2024-07-25 ENCOUNTER — HOSPITAL ENCOUNTER (OUTPATIENT)
Dept: GENERAL RADIOLOGY | Age: 59
Discharge: HOME | End: 2024-07-25
Attending: FAMILY MEDICINE
Payer: COMMERCIAL

## 2024-07-25 VITALS — BODY MASS INDEX: 27.77 KG/M2 | HEIGHT: 70 IN | WEIGHT: 194 LBS

## 2024-07-25 DIAGNOSIS — M25.561 RIGHT KNEE PAIN, UNSPECIFIED CHRONICITY: ICD-10-CM

## 2024-07-25 DIAGNOSIS — M17.11 PRIMARY OSTEOARTHRITIS OF RIGHT KNEE: Primary | ICD-10-CM

## 2024-07-25 DIAGNOSIS — Z01.89 ENCOUNTER FOR LOWER EXTREMITY COMPARISON IMAGING STUDY: ICD-10-CM

## 2024-07-25 PROCEDURE — 20610 DRAIN/INJ JOINT/BURSA W/O US: CPT | Performed by: FAMILY MEDICINE

## 2024-07-25 PROCEDURE — 99204 OFFICE O/P NEW MOD 45 MIN: CPT | Performed by: FAMILY MEDICINE

## 2024-07-25 PROCEDURE — 73564 X-RAY EXAM KNEE 4 OR MORE: CPT | Performed by: FAMILY MEDICINE

## 2024-07-25 PROCEDURE — 73562 X-RAY EXAM OF KNEE 3: CPT | Performed by: FAMILY MEDICINE

## 2024-07-25 RX ORDER — KETOROLAC TROMETHAMINE 30 MG/ML
30 INJECTION, SOLUTION INTRAMUSCULAR; INTRAVENOUS ONCE
Status: COMPLETED | OUTPATIENT
Start: 2024-07-25 | End: 2024-07-25

## 2024-07-25 RX ORDER — TRIAMCINOLONE ACETONIDE 40 MG/ML
40 INJECTION, SUSPENSION INTRA-ARTICULAR; INTRAMUSCULAR ONCE
Status: COMPLETED | OUTPATIENT
Start: 2024-07-25 | End: 2024-07-25

## 2024-07-25 RX ORDER — MELOXICAM 15 MG/1
15 TABLET ORAL DAILY
Qty: 30 TABLET | Refills: 0 | Status: SHIPPED | OUTPATIENT
Start: 2024-07-25

## 2024-07-25 RX ADMIN — KETOROLAC TROMETHAMINE 30 MG: 30 INJECTION, SOLUTION INTRAMUSCULAR; INTRAVENOUS at 09:29:00

## 2024-07-25 RX ADMIN — TRIAMCINOLONE ACETONIDE 40 MG: 40 INJECTION, SUSPENSION INTRA-ARTICULAR; INTRAMUSCULAR at 09:29:00

## 2024-07-25 NOTE — H&P
Sports Medicine Clinic Note     Subjective:    Chief Complaint: Right knee pain.    History of Present Illness: This is a 58-year-old male presenting with right knee pain, which has been ongoing since November 30, 2022. The patient describes his pain as aching and burning, with a pain level of 7 out of 10. The pain is constant and worsens with walking, but is somewhat improved by walking and running. The pain wakes him up at night and makes it difficult to fall asleep. The patient has tried physical therapy and anti-inflammatory medications (Meloxicam), both of which provided partial relief. He has not had acupuncture, chiropractic treatment, or PO steroids before. The patient has had previous X-rays and an MRI of the knee showing DJD he states. He does not use any assistive devices for walking. Had a CSI over a year ago with good relief, interested in this again today.    Objective:    Right Knee Examination:    Inspection:  - No visible deformity  - No erythema or bruising  - Mild swelling noted    Palpation:  - Tenderness over the medial and lateral joint lines  - No tenderness over the patella, tibial tubercle, or surrounding soft tissues    Range of Motion:  - Flexion: 0-130 degrees with pain at end range  - Extension: Full    Neurovascular:  - Sensation intact to light touch in all dermatomal distributions  - 2+ dorsalis pedis and posterior tibial pulses, capillary refill less than 2 seconds    Special Tests:  - Negative Lachman test  - Negative Estuardo test  - Negative Posterior Drawer test  - Mild discomfort with varus and valgus stress testing but no laxity    Diagnostic Tests:    Radiographs of both knees personally reviewed in the office. No fracture or dislocation is seen. Evidence of tricompartmental DJD brim bilaterally most prominent in the right medial tibiofemoral compartment and right patellofemoral compartment.  Moderate to severe in the right medial tibiofemoral  compartment.    Assessment:    Chronic right knee osteoarthritis.    Plan:    Additional Imaging/Workup: None required at this time.    Therapy: Physical therapy is deferred at this time.    Medications: Continue current medications as needed for pain management.    Procedures: Corticosteroid injection administered today for pain relief.    Activity Recommendations: Continue with activities as tolerated. Avoid high-impact activities that exacerbate pain.    Follow-Up: Follow up as needed based on response to the corticosteroid injection. Return earlier if symptoms worsen or new symptoms develop.    Procedure Note:    After discussion of the risks and benefits, the patient elected to proceed with a therapeutic injection into the right knee (tibiofemoral space). Confirmed that the patient does not have history of prior adverse reactions, active infections, or relevant allergies. There was no erythema or warmth, and the skin was clear.    The skin was sterilized with ChloraPrep. A 22 gauge needle was inserted via inferolateral approach. The site was injected with a mixture of 1 mL of kenalog 40 mg/mL, 1 mL of Toradol 30 mg/mL and 1 mL of 1% Lidocaine without Epinephrine. The injection was completed without complication, and a bandage was applied.    The patient tolerated the procedure well and was instructed to avoid strenuous activity for the next 24-48 hours and to use ice or Tylenol for pain as needed. The patient will call immediately with any signs of infection or allergic reaction.    Post-Injection Care: The patient tolerated the procedure well. An occlusive bandage was placed over the injection site. Post-injection care instructions provided to the patient. The patient was asked to avoid strenuous activity and continue to rest the area for 2-3 days before resuming regular activities. Patient advised that the area may be more painful for the first 1-2 days. They can use ice or Tylenol for pain as needed.   Patient was instructed to watch for fever, increased swelling, or persistent pain. The patient will call immediately with any signs of infection or allergic reaction.    Complications: The patient tolerated the procedure well without any complications.      Freeman Zaidi DO, CAEBONY   Primary Care Sports Medicine    Department of Orthopaedic Surgery  [facility]    [address] [place] [address]  [address] [place] [id number]    t: [phone]  f: [phone]      [facility]

## 2024-10-28 ENCOUNTER — TELEPHONE (OUTPATIENT)
Dept: FAMILY MEDICINE CLINIC | Facility: CLINIC | Age: 59
End: 2024-10-28

## 2024-10-28 NOTE — TELEPHONE ENCOUNTER
Patient calling to ask if form for physical is ready for , stated dropped off on 10/25/24 and needs it by Wednesday, either for  or fax to number on form.

## 2024-10-28 NOTE — TELEPHONE ENCOUNTER
Spoke to patient , unable to recall name of  personnel that received form.    Informed no encounter was created and we are not able to locate a form for him.    [facility] has a copy.  Provided our office fax number.    Patient requesting we fax it to the number on the form once completed.    Awaiting fax.

## 2024-10-28 NOTE — TELEPHONE ENCOUNTER
Patient is requesting a confirmation the 1st sheet is complete with the signature line for provider.    Patient is also mentioning he needs the form completed by this Wednesday and faxed back to the number on paper.

## 2024-11-06 ENCOUNTER — TELEPHONE (OUTPATIENT)
Dept: FAMILY MEDICINE CLINIC | Facility: CLINIC | Age: 59
End: 2024-11-06

## 2024-11-06 NOTE — TELEPHONE ENCOUNTER
Patient called back to advise fax to [employer] regarding his Physical has been received, however, it is missing the date his physical was completed. Patient will need this corrected and faxed back. Patient did not have a fax number and advised it is listed on the form.     Patient also advised if needed he can send another copy.     Patient advised this is urgent.     Please see telephone encounter 10/28/2024.

## 2024-11-26 ENCOUNTER — TELEPHONE (OUTPATIENT)
Dept: ORTHOPEDICS CLINIC | Facility: CLINIC | Age: 59
End: 2024-11-26

## 2024-11-26 ENCOUNTER — OFFICE VISIT (OUTPATIENT)
Dept: ORTHOPEDICS CLINIC | Facility: CLINIC | Age: 59
End: 2024-11-26
Payer: COMMERCIAL

## 2024-11-26 VITALS — WEIGHT: 194 LBS | HEIGHT: 70 IN | BODY MASS INDEX: 27.77 KG/M2

## 2024-11-26 DIAGNOSIS — M17.11 PRIMARY OSTEOARTHRITIS OF RIGHT KNEE: Primary | ICD-10-CM

## 2024-11-26 DIAGNOSIS — M23.203 DEGENERATIVE TEAR OF MEDIAL MENISCUS, RIGHT: ICD-10-CM

## 2024-11-26 DIAGNOSIS — M23.300 DEGENERATIVE TEAR OF LATERAL MENISCUS, RIGHT: ICD-10-CM

## 2024-11-26 PROCEDURE — 20610 DRAIN/INJ JOINT/BURSA W/O US: CPT | Performed by: FAMILY MEDICINE

## 2024-11-26 PROCEDURE — 99214 OFFICE O/P EST MOD 30 MIN: CPT | Performed by: FAMILY MEDICINE

## 2024-11-26 RX ORDER — TRIAMCINOLONE ACETONIDE 40 MG/ML
40 INJECTION, SUSPENSION INTRA-ARTICULAR; INTRAMUSCULAR ONCE
Status: COMPLETED | OUTPATIENT
Start: 2024-11-26 | End: 2024-11-26

## 2024-11-26 RX ORDER — KETOROLAC TROMETHAMINE 30 MG/ML
30 INJECTION, SOLUTION INTRAMUSCULAR; INTRAVENOUS ONCE
Status: COMPLETED | OUTPATIENT
Start: 2024-11-26 | End: 2024-11-26

## 2024-11-26 RX ADMIN — TRIAMCINOLONE ACETONIDE 40 MG: 40 INJECTION, SUSPENSION INTRA-ARTICULAR; INTRAMUSCULAR at 16:05:00

## 2024-11-26 RX ADMIN — KETOROLAC TROMETHAMINE 30 MG: 30 INJECTION, SOLUTION INTRAMUSCULAR; INTRAVENOUS at 16:05:00

## 2024-11-26 NOTE — PROGRESS NOTES
Sports Medicine Clinic Note    Subjective:    Chief Complaint: Right knee pain.      History: The patient is a 59-year-old male returning for follow-up after a corticosteroid injection performed four months ago for chronic right knee osteoarthritis. He reports significant improvement in symptoms since the injection, with pain levels reduced from 7/10 to 2-3/10. He was able to engage in low-impact activities and experienced improved sleep quality. Over the past three weeks, the pain has gradually returned to a moderate intensity, currently rated as 5/10. The patient requests a repeat corticosteroid injection. Additionally, he is interested in trying viscosupplementation. He denies any new injuries, mechanical symptoms, or systemic concerns.      Objective:      Right Knee Examination:      Inspection: No visible deformity. Mild swelling persists. No erythema or warmth.    Palpation: Tenderness over the medial and lateral joint lines. No tenderness over the patella, tibial tubercle, or surrounding soft tissues.    Range of Motion: Flexion: 0-130 degrees with mild discomfort at the end range.    Neurovascular: Sensation intact to light touch in all dermatomal distributions. 2+ dorsalis pedis and posterior tibial pulses, capillary refill less than 2 seconds.      Diagnostic Tests:      Radiographs reviewed from prior visit. Findings consistent with tricompartmental degenerative joint disease, most pronounced in the medial tibiofemoral and patellofemoral compartments on the right. No acute changes noted.      Assessment:      Chronic right knee osteoarthritis with symptomatic relief following prior corticosteroid injection.    Recurrent pain consistent with progressive degenerative joint disease.      Plan:      Additional Workup: Await prior authorization for viscosupplementation. Once approved, plan for administration.    Therapy: Continue PT exercises.  Medications: Continue current medications as needed for pain  management.    Procedures: Repeat corticosteroid injection into the right knee was performed during today’s visit for symptomatic relief.    Activity Recommendations: Encourage low-impact activities such as cycling or swimming. Advise avoiding high-impact or repetitive stress activities that exacerbate pain.      Follow-Up: Tentatively scheduled after completion of the viscosupplementation approval process. Patient to return sooner if symptoms worsen or new concerns arise.      Procedure Note:    After discussion of the risks and benefits, the patient elected to proceed with a therapeutic injection into the tibiofemoral space, right side. Confirmed that the patient does not have history of prior adverse reactions, active infections, or relevant allergies. There was no erythema or warmth, and the skin was clear.    The skin was sterilized with ChloraPrep. A 22 gauge needle was inserted via inferolateral approach. The site was injected with a mixture of 1 mL of triamcinolone 40 mg/mL, 1 mL of Toradol 30 mg/mL and 1 mL of 1% Lidocaine without Epinephrine. The injection was completed without complication, and a bandage was applied.    The patient tolerated the procedure well and was instructed to avoid strenuous activity for the next 24-48 hours and to use ice or Tylenol for pain as needed. The patient will call immediately with any signs of infection or allergic reaction.    Post-Injection Care: The patient tolerated the procedure well. An occlusive bandage was placed over the injection site. Post-injection care instructions provided to the patient. The patient was asked to avoid strenuous activity and continue to rest the area for 2-3 days before resuming regular activities. Patient advised that the area may be more painful for the first 1-2 days. They can use ice or Tylenol for pain as needed.  Patient was instructed to watch for fever, increased swelling, or persistent pain. The patient will call immediately with  any signs of infection or allergic reaction.    Complications: The patient tolerated the procedure well without any complications.      Freeman Zaidi DO, CAQSM   Primary Care Sports Medicine

## (undated) NOTE — LETTER
AUTHORIZATION FOR SURGICAL OPERATION OR OTHER PROCEDURE    1. I hereby authorize Dr. Morales Robles, Rajesh Calvo, and [facility] staff assigned to my case to perform the following operation and/or procedure at the [facility]:    _______________________________________________________________________________________________    Cortisone Injection to Right knee  _______________________________________________________________________________________________    2. My physician has explained the nature and purpose of the operation or other procedure, possible alternative methods of treatment, the risks involved, and the possibility of complication to me. I acknowledge that no guarantee has been made as to the result that may be obtained. 3.  I recognize that, during the course of this operation, or other procedure, unforseen conditions may necessitate additional or different procedure than those listed above. I, therefore, further authorize and request that the above named physician, his/her physician assistants or designees perform such procedures as are, in his/her professional opinion, necessary and desirable. 4.  Any tissue or organs removed in the operation or other procedure may be disposed of by and at the discretion of the [facility] and [facility]. 5.  I understand that in the event of a medical emergency, I will be transported by local paramedics to [facility] or other hospital emergency department. 6.  I certify that I have read and fully understand the above consent to operation and/or other procedure. 7.  I acknowledge that my physician has explained sedation/analgesia administration to me including the risks and benefits. I consent to the administration of sedation/analgesia as may be necessary or desirable in the judgement of my physician.     Witness signature: ___________________________________________________ Date: ______/______/_____                    Time:  ________ A. M.  P.M. Patient Name:  ______________________________________________________  (please print)      Patient signature:  ___________________________________________________             Relationship to Patient:           []  Parent    Responsible person                          []  Spouse  In case of minor or                    [] Other  _____________   Incompetent name:  __________________________________________________                               (please print)      _____________      Responsible person  In case of minor or  Incompetent signature:  _______________________________________________    Statement of Physician  My signature below affirms that prior to the time of the procedure, I have explained to the patient and/or his/her guardian, the risks and benefits involved in the proposed treatment and any reasonable alternative to the proposed treatment. I have also explained the risks and benefits involved in the refusal of the proposed treatment and have answered the patient's questions.                         Date:  ______/______/_______  Provider                      Signature:  __________________________________________________________       Time:  ___________ A.M    P.M.

## (undated) NOTE — LETTER
6/1/2023          To Whom It May Concern:    Ramon Londono is currently under my medical care and may not return to work at this time. He may return to work on 06/03/2023. Activity is restricted as follows: none. If you require additional information please contact our office.         Sincerely,    Susanne Taveras PA-C          Document generated by:  Susanne Taveras PA-C